# Patient Record
Sex: MALE | Race: OTHER | HISPANIC OR LATINO | URBAN - METROPOLITAN AREA
[De-identification: names, ages, dates, MRNs, and addresses within clinical notes are randomized per-mention and may not be internally consistent; named-entity substitution may affect disease eponyms.]

---

## 2017-01-16 ENCOUNTER — EMERGENCY (EMERGENCY)
Facility: HOSPITAL | Age: 27
LOS: 1 days | Discharge: HOME | End: 2017-01-16
Attending: EMERGENCY MEDICINE | Admitting: EMERGENCY MEDICINE
Payer: COMMERCIAL

## 2017-01-16 VITALS
RESPIRATION RATE: 16 BRPM | SYSTOLIC BLOOD PRESSURE: 125 MMHG | HEART RATE: 102 BPM | TEMPERATURE: 99 F | DIASTOLIC BLOOD PRESSURE: 75 MMHG | OXYGEN SATURATION: 99 %

## 2017-01-16 VITALS
TEMPERATURE: 98 F | DIASTOLIC BLOOD PRESSURE: 85 MMHG | HEART RATE: 95 BPM | HEIGHT: 72 IN | OXYGEN SATURATION: 97 % | RESPIRATION RATE: 18 BRPM | SYSTOLIC BLOOD PRESSURE: 143 MMHG | WEIGHT: 240.08 LBS
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
## 2017-01-16 DIAGNOSIS — S31.21XA LACERATION WITHOUT FOREIGN BODY OF PENIS, INITIAL ENCOUNTER: ICD-10-CM

## 2017-01-16 DIAGNOSIS — Z90.89 ACQUIRED ABSENCE OF OTHER ORGANS: Chronic | ICD-10-CM

## 2017-01-16 DIAGNOSIS — F17.200 NICOTINE DEPENDENCE, UNSPECIFIED, UNCOMPLICATED: ICD-10-CM
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
## 2017-01-16 DIAGNOSIS — I10 ESSENTIAL (PRIMARY) HYPERTENSION: ICD-10-CM

## 2017-01-16 DIAGNOSIS — Z90.09 ACQUIRED ABSENCE OF OTHER PART OF HEAD AND NECK: ICD-10-CM

## 2017-01-16 PROCEDURE — 12001 RPR S/N/AX/GEN/TRNK 2.5CM/<: CPT

## 2017-01-16 PROCEDURE — 99284 EMERGENCY DEPT VISIT MOD MDM: CPT | Mod: 25

## 2017-01-16 PROCEDURE — 99053 MED SERV 10PM-8AM 24 HR FAC: CPT

## 2017-01-16 NOTE — ED PROVIDER NOTE - PLAN OF CARE
1) Please follow-up with your Primary Medical Doctor in 3-5 days. If you need to find a new physician, please call [phone].  2) Return to the Emergency Department if you experiences: fevers, chills, discharge from penis or foreskin, or symptoms that are new or recurrent.  3) If you have any questions or concerns, do not hesitate to contact us at [phone].  4) Please give

## 2017-01-16 NOTE — ED PROVIDER NOTE - MEDICAL DECISION MAKING DETAILS
penile foreskin lac, uro procedure penile foreskin lac, uro procedure  Attending Ever: 25 y/o male with laceration to foreskin. seen by urology who repaired wound. pt given infection precautions will dc

## 2017-01-16 NOTE — ED PROVIDER NOTE - OBJECTIVE STATEMENT
Today while going to the bathroom, patient pulled the foreskin backwards quickly, and caused a cut in the skin. Has been able to urinate afterwards. No drainage from penis, no other injury or complaints.

## 2017-01-16 NOTE — ED ADULT NURSE REASSESSMENT NOTE - NS ED NURSE REASSESS COMMENT FT1
3489 Report received from night nurse. Awaiting urology for further eval. No c/o pain. Blood oozing foreskin small amount

## 2017-01-16 NOTE — ED PROVIDER NOTE - PROGRESS NOTE DETAILS
Uro came to assess and will suture lac Uro came to assess and will suture lac. Pt lives in [place], [place], and flying back today. Agrees to f/u w/ Urology in [place].

## 2017-01-16 NOTE — ED PROVIDER NOTE - CARE PLAN
Principal Discharge DX:	Laceration of penis, initial encounter  Instructions for follow-up, activity and diet:	1) Please follow-up with your Primary Medical Doctor in 3-5 days. If you need to find a new physician, please call [phone].  2) Return to the Emergency Department if you experiences: fevers, chills, discharge from penis or foreskin, or symptoms that are new or recurrent.  3) If you have any questions or concerns, do not hesitate to contact us at [phone].  4) Please give Principal Discharge DX:	Laceration of penis, initial encounter  Instructions for follow-up, activity and diet:	1) Please follow-up with your Primary Medical Doctor in 3-5 days. If you need to find a new physician, please call [phone].  2) Return to the Emergency Department if you experiences: fevers, chills, discharge from penis or foreskin, or symptoms that are new or recurrent.  3) If you have any questions or concerns, do not hesitate to contact us at [phone].  4) Please give Principal Discharge DX:	Laceration of penis, initial encounter  Instructions for follow-up, activity and diet:	1) Please follow-up with your Primary Medical Doctor in 3-5 days. If you need to find a new physician, please call [phone].  2) Return to the Emergency Department if you experiences: fevers, chills, discharge from penis or foreskin, or symptoms that are new or recurrent.  3) If you have any questions or concerns, do not hesitate to contact us at [phone].  4) Please give Principal Discharge DX:	Laceration of penis, initial encounter  Instructions for follow-up, activity and diet:	1) Please follow-up with your Primary Medical Doctor in 3-5 days. If you need to find a new physician, please call [phone].  2) Return to the Emergency Department if you experiences: fevers, chills, discharge from penis or foreskin, or symptoms that are new or recurrent.  3) If you have any questions or concerns, do not hesitate to contact us at [phone].  4) Please give

## 2017-01-16 NOTE — ED ADULT NURSE NOTE - OBJECTIVE STATEMENT
26 year old male presented to the ED complaining of a laceration to the foreskin of his penis. As per patient he went to pee earlier and pulled down his shorts and pulled back his foreskin hard enough to break the skin. As per the patient there was lulu blood at the site and he was bleeding on the floor. On arrival the bleeding is controlled with some coagulated blood underneath the foreskin. PT is A&O x 4, VSS, afebrile. Pt denies fever, chills, NVD. Pt denies feeling lightheaded or weakness. Pt denies burning on urination. The site is absent of signs of infection.

## 2017-01-16 NOTE — ED PROVIDER NOTE - PHYSICAL EXAMINATION
Physical Exam: young M in mild distress, AAOx3, CTAB, normal rate and regular rhythm, No focal motor or sensory deficits. Genitalia exam shows laceration of the foreskin, not a through-and-through laceration, no active bleeding, no testicular swelling ~ Dick Cash MD Attending Curtis: exam above  Physical Exam: young M in mild distress, AAOx3, CTAB, normal rate and regular rhythm, No focal motor or sensory deficits. Genitalia exam shows laceration of the foreskin, not a through-and-through laceration, no active bleeding, no testicular swelling ~ Dick Cash MD